# Patient Record
Sex: MALE | Race: OTHER | HISPANIC OR LATINO | URBAN - METROPOLITAN AREA
[De-identification: names, ages, dates, MRNs, and addresses within clinical notes are randomized per-mention and may not be internally consistent; named-entity substitution may affect disease eponyms.]

---

## 2017-04-24 ENCOUNTER — EMERGENCY (EMERGENCY)
Facility: HOSPITAL | Age: 47
LOS: 0 days | Discharge: HOME | End: 2017-04-25

## 2017-06-28 DIAGNOSIS — R10.32 LEFT LOWER QUADRANT PAIN: ICD-10-CM

## 2017-06-28 DIAGNOSIS — Z87.891 PERSONAL HISTORY OF NICOTINE DEPENDENCE: ICD-10-CM

## 2019-06-30 ENCOUNTER — EMERGENCY (EMERGENCY)
Facility: HOSPITAL | Age: 49
LOS: 0 days | Discharge: HOME | End: 2019-06-30
Attending: EMERGENCY MEDICINE | Admitting: EMERGENCY MEDICINE
Payer: COMMERCIAL

## 2019-06-30 VITALS
OXYGEN SATURATION: 97 % | DIASTOLIC BLOOD PRESSURE: 75 MMHG | HEART RATE: 90 BPM | RESPIRATION RATE: 20 BRPM | SYSTOLIC BLOOD PRESSURE: 162 MMHG | TEMPERATURE: 100 F

## 2019-06-30 VITALS — TEMPERATURE: 99 F

## 2019-06-30 DIAGNOSIS — K08.89 OTHER SPECIFIED DISORDERS OF TEETH AND SUPPORTING STRUCTURES: ICD-10-CM

## 2019-06-30 DIAGNOSIS — F17.200 NICOTINE DEPENDENCE, UNSPECIFIED, UNCOMPLICATED: ICD-10-CM

## 2019-06-30 DIAGNOSIS — K04.7 PERIAPICAL ABSCESS WITHOUT SINUS: ICD-10-CM

## 2019-06-30 PROCEDURE — 99283 EMERGENCY DEPT VISIT LOW MDM: CPT

## 2019-06-30 RX ORDER — ACETAMINOPHEN 500 MG
975 TABLET ORAL ONCE
Refills: 0 | Status: COMPLETED | OUTPATIENT
Start: 2019-06-30 | End: 2019-06-30

## 2019-06-30 RX ORDER — AMOXICILLIN 250 MG/5ML
500 SUSPENSION, RECONSTITUTED, ORAL (ML) ORAL ONCE
Refills: 0 | Status: DISCONTINUED | OUTPATIENT
Start: 2019-06-30 | End: 2019-06-30

## 2019-06-30 RX ORDER — IBUPROFEN 200 MG
1 TABLET ORAL
Qty: 21 | Refills: 0
Start: 2019-06-30 | End: 2019-07-06

## 2019-06-30 RX ORDER — AMOXICILLIN 250 MG/5ML
1 SUSPENSION, RECONSTITUTED, ORAL (ML) ORAL
Qty: 30 | Refills: 0
Start: 2019-06-30 | End: 2019-07-09

## 2019-06-30 RX ADMIN — Medication 975 MILLIGRAM(S): at 15:47

## 2019-06-30 NOTE — ED PROVIDER NOTE - PROGRESS NOTE DETAILS
I was present for and supervised the key/critical aspects of the procedures performed during the care of the patient. (I&D of #28 dental abscess performed by Dental resident)

## 2019-06-30 NOTE — ED PROVIDER NOTE - NSFOLLOWUPCLINICS_GEN_ALL_ED_FT
[facility]  Dental  [address] [place] [id number]  Phone: [phone]  Fax:   Follow Up Time:

## 2019-06-30 NOTE — ED PROVIDER NOTE - ATTENDING CONTRIBUTION TO CARE
48y m h/o dm p/w RL dental pain x 2d. Acomp by mild facial swelling. Denies f/c, diff swallowing or breathing, drooling, cough, cp/sob. PE: nad, ncat, mild R mandibular swelling, op- ttp tooth #28, +surrounding gingival erythema, no buccal edema, tongue nl, pharynx nl, no trismus/stridor/drooling, neck supple, rrr nl s1s2, ctab, ext nl.

## 2019-06-30 NOTE — ED PROVIDER NOTE - OBJECTIVE STATEMENT
47 y/o male with hx DM, smoker presents to the ED c/o "I have right lower dental pain with facial swelling since last night. I've had fever." no difficulty swallowing

## 2019-06-30 NOTE — CONSULT NOTE ADULT - SUBJECTIVE AND OBJECTIVE BOX
Patient is a 48y old  Male who presents with a chief complaint of lower right dental pain and swelling.    HPI: Patient reports that he presented to his private dentist 3 weeks ago who said that he may have to       PAST MEDICAL & SURGICAL HISTORY:  No pertinent past medical history    Allergies : No Known Allergies    Vital Signs Last 24 Hrs  T(C): 38 (30 Jun 2019 14:35), Max: 38 (30 Jun 2019 14:35)  T(F): 100.4 (30 Jun 2019 14:35), Max: 100.4 (30 Jun 2019 14:35)  HR: 90 (30 Jun 2019 14:35) (90 - 90)  BP: 162/75 (30 Jun 2019 14:35) (162/75 - 162/75)  BP(mean): --  RR: 20 (30 Jun 2019 14:35) (20 - 20)  SpO2: 97% (30 Jun 2019 14:35) (97% - 97%)    EOE:  TMJ ( -  ) clicks                     ( -  ) pops                     ( -  ) crepitus             Mandible <<FROM>>             Facial bones and MOM <<grossly intact>>             ( -  ) trismus             ( -  ) lymphadenopathy             ( +  ) swelling             ( +  ) asymmetry             ( +  ) palpation             ( -  ) dyspnea             ( -  ) dysphagia             ( -  ) loss of consciousness    IOE:                       Caries << + >>                hard/soft palate:  <<No pathology noted>>            tongue/FOM <<No pathology noted>>            labial/buccal mucosa <<No pathology noted>>           ( +  ) percussion           ( +  ) palpation           ( +  ) swelling            ( -  ) abscess           ( -  ) sinus tract      *ASSESSMENT: Patient presents with lower right swelling limited to the right vestibule. Border of the mandible palpable. Tooth #28 has large distal carious lesion.      *PLAN: Incision and drainage.    PROCEDURE:   Anesthesia: <<  2 carpules of 0.5% marcaine with 1:[id number] epinephrine used for inferior alveolar nerve block and local infiltrations.  >>   Treatment: << Incision and drainage of lower right swelling completed, irrigated with saline.  Patient was advised to either return to his private dentist as soon as possible or present to [facility] dental clinic for emergency care.  >>     RECOMMENDATIONS:  1) << Amoxicillin 500 mg 1 tab PO Q8H and ibuprofen 600 mg  >>  2) Dental F/U with outpatient dentist for comprehensive dental care.   3) If any difficulty swallowing/breathing, fever occur, return to ER.     Resident Name, pager #  Ana Olsen, [phone]

## 2020-12-16 ENCOUNTER — APPOINTMENT (OUTPATIENT)
Dept: ORTHOPEDIC SURGERY | Facility: CLINIC | Age: 50
End: 2020-12-16
Payer: COMMERCIAL

## 2020-12-16 VITALS — TEMPERATURE: 97.5 F

## 2020-12-16 DIAGNOSIS — F17.200 NICOTINE DEPENDENCE, UNSPECIFIED, UNCOMPLICATED: ICD-10-CM

## 2020-12-16 DIAGNOSIS — Z80.8 FAMILY HISTORY OF MALIGNANT NEOPLASM OF OTHER ORGANS OR SYSTEMS: ICD-10-CM

## 2020-12-16 DIAGNOSIS — Z86.39 PERSONAL HISTORY OF OTHER ENDOCRINE, NUTRITIONAL AND METABOLIC DISEASE: ICD-10-CM

## 2020-12-16 PROBLEM — Z00.00 ENCOUNTER FOR PREVENTIVE HEALTH EXAMINATION: Status: ACTIVE | Noted: 2020-12-16

## 2020-12-16 PROCEDURE — 99204 OFFICE O/P NEW MOD 45 MIN: CPT

## 2020-12-16 PROCEDURE — 99072 ADDL SUPL MATRL&STAF TM PHE: CPT

## 2020-12-16 PROCEDURE — 73502 X-RAY EXAM HIP UNI 2-3 VIEWS: CPT | Mod: LT

## 2020-12-16 RX ORDER — METFORMIN HYDROCHLORIDE 625 MG/1
TABLET ORAL
Refills: 0 | Status: ACTIVE | COMMUNITY

## 2020-12-16 NOTE — PHYSICAL EXAM
[de-identified] : General appearance: well nourished and hydrated, pleasant, alert and oriented x 3, cooperative.\par Cardiovascular: no apparent abnormalities, no lower leg edema, no varicosities, pedal pulses are palpable.\par Neurologic: sensation is normal, no muscle weakness in upper or lower extremities\par Dermatologic no apparent skin lesions, moist, warm, no rash.\par Gait: nonantalgic.\par \par Left knee\par Inspection: no effusion or erythema.\par Wounds: none.\par Alignment: normal.\par Palpation: no specific tenderness on palpation.\par ROM: 0-120 degrees\par Ligamentous laxity: all ligaments appear stable\par Muscle Test: good quad strength.\par \par Right knee\par Inspection: no effusion or erythema.\par Wounds: none.\par Alignment: normal.\par Palpation: no specific tenderness on palpation.\par ROM: 0-120 degrees\par Ligamentous laxity: all ligaments appear stable\par Muscle Test: good quad strength.\par \par Left hip\par Inspection: No swelling or ecchymosis.\par Wounds: none.\par Palpation: non-tender.\par Stability: no instability.\par Strength: 5/5 all motor groups.\par ROM: Flexion to 80, ER 20, no IR, ABD 20\par Leg length: equal.\par \par Right hip\par Inspection: No swelling or ecchymosis.\par Wounds: none.\par Palpation: non-tender.\par Stability: no instability.\par Strength: 5/5 all motor groups.\par ROM:Flexion to 90, ER 30, IR 0, ABD 30 \par Leg length: equal.\par \par  Ranging left hip gives pain laterally to the knee.  [de-identified] : Radiographs done today AP pelvis and lateral of the right hip shows the bony structures are intact , the right hip demonstrates about 50 percent joint space narrowing. The left hip has about 80 percent joint space narrowing.

## 2020-12-16 NOTE — HISTORY OF PRESENT ILLNESS
[Pain Location] : pain [] : left hip [Worsening] : worsening [10] : a maximum pain level of 10/10 [Walking] : walking [Standing] : standing [Daily] : ~He/She~ states the symptoms seem to be occuring daily [de-identified] : 50 year old male presents to the office today complaining of left hip pain that has worsened in the last 1.5 months. Patient reports pain that is sharp and achy in nature. Patient reports pain that is at the lateral hip, no groin pain. Pain radiates down the lateral thigh to the knee, but does not extend past the knee. PAtient reports clicking and a loss of motion in the left hip. Patient states some days he has pain immediately with ambulation, while other days he can ambulate only about 3-4 blocks due to the pain. He reports doing okay with negotiating stairs. Patient reports difficulty putting socks and shoes on and reports pain with prolonged sitting. He is taking Aleve for pain with only minimal relief. Patient is here today to discuss his options for pain relief.

## 2020-12-16 NOTE — ASSESSMENT
[FreeTextEntry1] : 50 year old male presents for right hip pain. Several year history of intermittent hip pain. The pain has been getting progressively worse. He feels it during certain activities. At times it is severe. At times he will limp. He usually can walk 3-4 blocks. The pain is not in the groin , it is located laterally with radiation down the thigh towards the knee. At its worst its a 10/10. He has difficulty putting on shoes and socks. He is only taking OTC NSAIDs with some relief. His Hx , exam and radiographs are all consistent with bilateral hip arthritis , more severe on the left than the right . We will start him on Mobic 15 mg po qd. We also discussed weight loss. He can f/u in 1 month. We will evaluate how he's doing at that time.

## 2021-01-13 ENCOUNTER — APPOINTMENT (OUTPATIENT)
Dept: ORTHOPEDIC SURGERY | Facility: CLINIC | Age: 51
End: 2021-01-13

## 2021-01-19 RX ORDER — MELOXICAM 15 MG/1
15 TABLET ORAL
Qty: 30 | Refills: 0 | Status: DISCONTINUED | COMMUNITY
Start: 2020-12-16 | End: 2021-01-19

## 2021-02-03 ENCOUNTER — TRANSCRIPTION ENCOUNTER (OUTPATIENT)
Age: 51
End: 2021-02-03

## 2021-02-03 ENCOUNTER — APPOINTMENT (OUTPATIENT)
Dept: ORTHOPEDIC SURGERY | Facility: CLINIC | Age: 51
End: 2021-02-03
Payer: MEDICAID

## 2021-02-03 DIAGNOSIS — M16.12 UNILATERAL PRIMARY OSTEOARTHRITIS, LEFT HIP: ICD-10-CM

## 2021-02-03 DIAGNOSIS — M16.11 UNILATERAL PRIMARY OSTEOARTHRITIS, RIGHT HIP: ICD-10-CM

## 2021-02-03 PROCEDURE — 99072 ADDL SUPL MATRL&STAF TM PHE: CPT

## 2021-02-03 PROCEDURE — 99214 OFFICE O/P EST MOD 30 MIN: CPT | Mod: 57

## 2021-02-03 RX ORDER — MELOXICAM 15 MG/1
15 TABLET ORAL DAILY
Qty: 30 | Refills: 0 | Status: ACTIVE | COMMUNITY
Start: 2021-02-03 | End: 1900-01-01

## 2021-02-03 RX ORDER — TRAMADOL HYDROCHLORIDE 50 MG/1
50 TABLET, COATED ORAL DAILY
Qty: 25 | Refills: 0 | Status: ACTIVE | COMMUNITY
Start: 2021-02-03 | End: 1900-01-01

## 2021-02-03 NOTE — HISTORY OF PRESENT ILLNESS
[de-identified] : [id number]- 50 year old male presents to the office today complaining of left hip pain that has worsened in the last 1.5 months. Patient reports pain that is sharp and achy in nature. Patient reports pain that is at the lateral hip, no groin pain. Pain radiates down the lateral thigh to the knee, but does not extend past the knee. PAtient reports clicking and a loss of motion in the left hip. Patient states some days he has pain immediately with ambulation, while other days he can ambulate only about 3-4 blocks due to the pain. He reports doing okay with negotiating stairs. Patient reports difficulty putting socks and shoes on and reports pain with prolonged sitting. He is taking Aleve for pain with only minimal relief. Patient is here today to discuss his options for pain relief.\par \par 2/3/2021- 50 year old male presents for follow up of his bilateral painful arthritis hips to discuss treatment options.

## 2021-02-03 NOTE — PHYSICAL EXAM
[de-identified] : 12/16/2020- \par Left knee\par Inspection: no effusion or erythema.\par Wounds: none.\par Alignment: normal.\par Palpation: no specific tenderness on palpation.\par ROM: 0-120 degrees\par Ligamentous laxity: all ligaments appear stable\par Muscle Test: good quad strength.\par \par Right knee\par Inspection: no effusion or erythema.\par Wounds: none.\par Alignment: normal.\par Palpation: no specific tenderness on palpation.\par ROM: 0-120 degrees\par Ligamentous laxity: all ligaments appear stable\par Muscle Test: good quad strength.\par \par Left hip\par Inspection: No swelling or ecchymosis.\par Wounds: none.\par Palpation: non-tender.\par Stability: no instability.\par Strength: 5/5 all motor groups.\par ROM: Flexion to 80, ER 20, no IR, ABD 20\par Leg length: equal.\par \par Right hip\par Inspection: No swelling or ecchymosis.\par Wounds: none.\par Palpation: non-tender.\par Stability: no instability.\par Strength: 5/5 all motor groups.\par ROM:Flexion to 90, ER 30, IR 0, ABD 30 \par Leg length: equal.\par \par  Ranging left hip gives pain laterally to the knee.  [de-identified] : 12/16/2020- Radiographs done today AP pelvis and lateral of the right hip shows the bony structures are intact , the right hip demonstrates about 50 percent joint space narrowing. The left hip has about 80 percent joint space narrowing.

## 2021-02-03 NOTE — ASSESSMENT
[FreeTextEntry1] : 12/16/2020- 50 year old male presents for right hip pain. Several year history of intermittent hip pain. The pain has been getting progressively worse. He feels it during certain activities. At times it is severe. At times he will limp. He usually can walk 3-4 blocks. The pain is not in the groin , it is located laterally with radiation down the thigh towards the knee. At its worst its a 10/10. He has difficulty putting on shoes and socks. He is only taking OTC NSAIDs with some relief. His Hx , exam and radiographs are all consistent with bilateral hip arthritis , more severe on the left than the right . We will start him on Mobic 15 mg po qd. We also discussed weight loss. He can f/u in 1 month. We will evaluate how he's doing at that time. \par \par 02/03/2021- Pt presents for follow up of his bilateral arthritic painful hips to discuss Tx options. He has been taking Mobic without significant relieve. The pain has become incapacitating and he is now at the point he would like to proceed with elective hip replacement surgery. I do think at this point that this is a good option for him. He is a diabetic and will need to see his endocrinologist. He states that his blood sugars are well controlled. His last hemoglobin A1C is in the 6 range. He will also see PCP. In the meantime he will remain on the Mobic, he will stop it the week before surgery. We will also give him Tramadol to take for his pain at night. Pt has advanced arthritis of both hips and was told after surgery left lower extremity will be slightly longer than the right. After his right hip is replaced he should be even. \par \par The risks, benefits, alternatives of treatment, and aftercare precautions following joint replacement surgery were reviewed with the patient and all questions were answered. Models were used, radiographs reviewed and a brochure was given to the patient. The implant type utilized, including bearing surfaces fixation techniques were reviewed in detail, and the patient chose to proceed with elective joint replacement surgery. The patient's body mass index was recorded in my office notes, and for those patients whose  body mass index of greater than 30, I recommended that they review a weight reduction program with their internist. The importance of smoking cessation was reviewed with all patient's, and for those patients who still smoke, I recommended that they review a smoking cessation program with their internist.

## 2021-02-11 ENCOUNTER — OUTPATIENT (OUTPATIENT)
Dept: OUTPATIENT SERVICES | Facility: HOSPITAL | Age: 51
LOS: 1 days | Discharge: HOME | End: 2021-02-11
Payer: MEDICAID

## 2021-02-11 ENCOUNTER — RESULT REVIEW (OUTPATIENT)
Age: 51
End: 2021-02-11

## 2021-02-11 DIAGNOSIS — M25.559 PAIN IN UNSPECIFIED HIP: ICD-10-CM

## 2021-02-11 PROCEDURE — 72192 CT PELVIS W/O DYE: CPT | Mod: 26

## 2021-02-16 ENCOUNTER — RESULT REVIEW (OUTPATIENT)
Age: 51
End: 2021-02-16

## 2021-02-16 ENCOUNTER — OUTPATIENT (OUTPATIENT)
Dept: OUTPATIENT SERVICES | Facility: HOSPITAL | Age: 51
LOS: 1 days | Discharge: HOME | End: 2021-02-16
Payer: MEDICAID

## 2021-02-16 VITALS
SYSTOLIC BLOOD PRESSURE: 144 MMHG | RESPIRATION RATE: 16 BRPM | HEIGHT: 69 IN | TEMPERATURE: 98 F | WEIGHT: 222.01 LBS | HEART RATE: 72 BPM | DIASTOLIC BLOOD PRESSURE: 75 MMHG | OXYGEN SATURATION: 99 %

## 2021-02-16 DIAGNOSIS — M16.12 UNILATERAL PRIMARY OSTEOARTHRITIS, LEFT HIP: ICD-10-CM

## 2021-02-16 DIAGNOSIS — Z90.49 ACQUIRED ABSENCE OF OTHER SPECIFIED PARTS OF DIGESTIVE TRACT: Chronic | ICD-10-CM

## 2021-02-16 DIAGNOSIS — Z98.890 OTHER SPECIFIED POSTPROCEDURAL STATES: Chronic | ICD-10-CM

## 2021-02-16 DIAGNOSIS — Z01.818 ENCOUNTER FOR OTHER PREPROCEDURAL EXAMINATION: ICD-10-CM

## 2021-02-16 LAB
A1C WITH ESTIMATED AVERAGE GLUCOSE RESULT: 7.2 % — HIGH (ref 4–5.6)
ALBUMIN SERPL ELPH-MCNC: 4.4 G/DL — SIGNIFICANT CHANGE UP (ref 3.5–5.2)
ALP SERPL-CCNC: 60 U/L — SIGNIFICANT CHANGE UP (ref 30–115)
ALT FLD-CCNC: 20 U/L — SIGNIFICANT CHANGE UP (ref 0–41)
ANION GAP SERPL CALC-SCNC: 8 MMOL/L — SIGNIFICANT CHANGE UP (ref 7–14)
APTT BLD: 35.9 SEC — SIGNIFICANT CHANGE UP (ref 27–39.2)
AST SERPL-CCNC: 14 U/L — SIGNIFICANT CHANGE UP (ref 0–41)
BASOPHILS # BLD AUTO: 0.03 K/UL — SIGNIFICANT CHANGE UP (ref 0–0.2)
BASOPHILS NFR BLD AUTO: 0.4 % — SIGNIFICANT CHANGE UP (ref 0–1)
BILIRUB SERPL-MCNC: 0.3 MG/DL — SIGNIFICANT CHANGE UP (ref 0.2–1.2)
BLD GP AB SCN SERPL QL: SIGNIFICANT CHANGE UP
BUN SERPL-MCNC: 13 MG/DL — SIGNIFICANT CHANGE UP (ref 10–20)
CALCIUM SERPL-MCNC: 9.5 MG/DL — SIGNIFICANT CHANGE UP (ref 8.5–10.1)
CHLORIDE SERPL-SCNC: 107 MMOL/L — SIGNIFICANT CHANGE UP (ref 98–110)
CO2 SERPL-SCNC: 26 MMOL/L — SIGNIFICANT CHANGE UP (ref 17–32)
CREAT SERPL-MCNC: 0.7 MG/DL — SIGNIFICANT CHANGE UP (ref 0.7–1.5)
EOSINOPHIL # BLD AUTO: 0.14 K/UL — SIGNIFICANT CHANGE UP (ref 0–0.7)
EOSINOPHIL NFR BLD AUTO: 1.8 % — SIGNIFICANT CHANGE UP (ref 0–8)
ESTIMATED AVERAGE GLUCOSE: 160 MG/DL — HIGH (ref 68–114)
GLUCOSE SERPL-MCNC: 156 MG/DL — HIGH (ref 70–99)
HCT VFR BLD CALC: 40 % — LOW (ref 42–52)
HGB BLD-MCNC: 13.5 G/DL — LOW (ref 14–18)
IMM GRANULOCYTES NFR BLD AUTO: 0.3 % — SIGNIFICANT CHANGE UP (ref 0.1–0.3)
INR BLD: 0.97 RATIO — SIGNIFICANT CHANGE UP (ref 0.65–1.3)
LYMPHOCYTES # BLD AUTO: 2.44 K/UL — SIGNIFICANT CHANGE UP (ref 1.2–3.4)
LYMPHOCYTES # BLD AUTO: 32.2 % — SIGNIFICANT CHANGE UP (ref 20.5–51.1)
MCHC RBC-ENTMCNC: 30.6 PG — SIGNIFICANT CHANGE UP (ref 27–31)
MCHC RBC-ENTMCNC: 33.8 G/DL — SIGNIFICANT CHANGE UP (ref 32–37)
MCV RBC AUTO: 90.7 FL — SIGNIFICANT CHANGE UP (ref 80–94)
MONOCYTES # BLD AUTO: 0.63 K/UL — HIGH (ref 0.1–0.6)
MONOCYTES NFR BLD AUTO: 8.3 % — SIGNIFICANT CHANGE UP (ref 1.7–9.3)
MRSA PCR RESULT.: NEGATIVE — SIGNIFICANT CHANGE UP
NEUTROPHILS # BLD AUTO: 4.31 K/UL — SIGNIFICANT CHANGE UP (ref 1.4–6.5)
NEUTROPHILS NFR BLD AUTO: 57 % — SIGNIFICANT CHANGE UP (ref 42.2–75.2)
NRBC # BLD: 0 /100 WBCS — SIGNIFICANT CHANGE UP (ref 0–0)
PLATELET # BLD AUTO: 250 K/UL — SIGNIFICANT CHANGE UP (ref 130–400)
POTASSIUM SERPL-MCNC: 4.7 MMOL/L — SIGNIFICANT CHANGE UP (ref 3.5–5)
POTASSIUM SERPL-SCNC: 4.7 MMOL/L — SIGNIFICANT CHANGE UP (ref 3.5–5)
PROT SERPL-MCNC: 6.8 G/DL — SIGNIFICANT CHANGE UP (ref 6–8)
PROTHROM AB SERPL-ACNC: 11.1 SEC — SIGNIFICANT CHANGE UP (ref 9.95–12.87)
RBC # BLD: 4.41 M/UL — LOW (ref 4.7–6.1)
RBC # FLD: 14.8 % — HIGH (ref 11.5–14.5)
SODIUM SERPL-SCNC: 141 MMOL/L — SIGNIFICANT CHANGE UP (ref 135–146)
WBC # BLD: 7.57 K/UL — SIGNIFICANT CHANGE UP (ref 4.8–10.8)
WBC # FLD AUTO: 7.57 K/UL — SIGNIFICANT CHANGE UP (ref 4.8–10.8)

## 2021-02-16 PROCEDURE — 73502 X-RAY EXAM HIP UNI 2-3 VIEWS: CPT | Mod: 26,LT

## 2021-02-16 PROCEDURE — 71046 X-RAY EXAM CHEST 2 VIEWS: CPT | Mod: 26

## 2021-02-16 PROCEDURE — 93010 ELECTROCARDIOGRAM REPORT: CPT

## 2021-02-16 NOTE — H&P PST ADULT - HISTORY OF PRESENT ILLNESS
Pt has a hx of OA for many years but pt states pain has progressively gotten worse over the last year in left hip. Pt complains of pain rated 11/10 which affects ability to walk and do other tasks. Pt takes NSAIDS for pain relief but states "it does not work that great." Pt now for listed procedure. Denies any chest pain, difficulty breathing, SOB, palpitations, dysuria, URI, or any other infections in the last 2 weeks. Denies any recent travel, contact, or exposure to any persons with known or suspected COVID-19. Pt also denies COVID testing within the last 2 weeks. Denies any suicidal or homicidal ideations. Pt advised to self quarantine until day of procedure. Exercise tolerance of 1-2 flights of stairs without dyspnea but with limitations d/t left hip pain. JAYA reviewed with patient. Pt verbalized understanding of all pre-operative instructions.    Anesthesia Alert  YES--Difficult Airway: Class IV  NO--History of neck surgery or radiation  NO--Limited ROM of neck  NO--History of Malignant hyperthermia  NO--No personal or family history of Pseudocholinesterase deficiency.  NO--Prior Anesthesia Complication  NO--Latex Allergy  NO--Loose teeth  NO--History of Rheumatoid Arthritis  NO--JAYA  YES--Other: Pt states he had a fever (101F) post extubation for back surgery but has had surgeries after and denies having high temp.

## 2021-02-16 NOTE — H&P PST ADULT - REASON FOR ADMISSION
51 yo male presents for PAST in preparation for left total hip replacement on 2/25/2021 under regional anesthesia by Dr. Rausch (OR [facility])

## 2021-02-16 NOTE — H&P PST ADULT - NSANTHOSAYNRD_GEN_A_CORE
No. JAYA screening performed.  STOP BANG Legend: 0-2 = LOW Risk; 3-4 = INTERMEDIATE Risk; 5-8 = HIGH Risk

## 2021-02-22 ENCOUNTER — OUTPATIENT (OUTPATIENT)
Dept: OUTPATIENT SERVICES | Facility: HOSPITAL | Age: 51
LOS: 1 days | Discharge: HOME | End: 2021-02-22

## 2021-02-22 ENCOUNTER — LABORATORY RESULT (OUTPATIENT)
Age: 51
End: 2021-02-22

## 2021-02-22 DIAGNOSIS — Z11.59 ENCOUNTER FOR SCREENING FOR OTHER VIRAL DISEASES: ICD-10-CM

## 2021-02-22 DIAGNOSIS — Z90.49 ACQUIRED ABSENCE OF OTHER SPECIFIED PARTS OF DIGESTIVE TRACT: Chronic | ICD-10-CM

## 2021-02-22 DIAGNOSIS — Z98.890 OTHER SPECIFIED POSTPROCEDURAL STATES: Chronic | ICD-10-CM

## 2021-02-22 PROBLEM — E11.9 TYPE 2 DIABETES MELLITUS WITHOUT COMPLICATIONS: Chronic | Status: ACTIVE | Noted: 2021-02-16

## 2021-02-22 PROBLEM — M19.90 UNSPECIFIED OSTEOARTHRITIS, UNSPECIFIED SITE: Chronic | Status: ACTIVE | Noted: 2021-02-16

## 2021-02-24 ENCOUNTER — RX RENEWAL (OUTPATIENT)
Age: 51
End: 2021-02-24

## 2021-02-24 NOTE — ASU PATIENT PROFILE, ADULT - PMH
DM (diabetes mellitus)    Gastroesophageal reflux disease, unspecified whether esophagitis present    OA (osteoarthritis)

## 2021-02-25 ENCOUNTER — RESULT REVIEW (OUTPATIENT)
Age: 51
End: 2021-02-25

## 2021-02-25 ENCOUNTER — APPOINTMENT (OUTPATIENT)
Dept: ORTHOPEDIC SURGERY | Facility: HOSPITAL | Age: 51
End: 2021-02-25
Payer: MEDICAID

## 2021-02-25 ENCOUNTER — INPATIENT (INPATIENT)
Facility: HOSPITAL | Age: 51
LOS: 0 days | Discharge: HOME HEALTH | End: 2021-02-26
Attending: ORTHOPAEDIC SURGERY | Admitting: ORTHOPAEDIC SURGERY
Payer: MEDICAID

## 2021-02-25 VITALS
HEART RATE: 65 BPM | DIASTOLIC BLOOD PRESSURE: 74 MMHG | SYSTOLIC BLOOD PRESSURE: 133 MMHG | OXYGEN SATURATION: 98 % | TEMPERATURE: 98 F | WEIGHT: 222.01 LBS | HEIGHT: 69 IN | RESPIRATION RATE: 17 BRPM

## 2021-02-25 DIAGNOSIS — Z98.890 OTHER SPECIFIED POSTPROCEDURAL STATES: Chronic | ICD-10-CM
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
## 2021-02-25 DIAGNOSIS — M16.12 UNILATERAL PRIMARY OSTEOARTHRITIS, LEFT HIP: ICD-10-CM

## 2021-02-25 DIAGNOSIS — Z90.49 ACQUIRED ABSENCE OF OTHER SPECIFIED PARTS OF DIGESTIVE TRACT: Chronic | ICD-10-CM

## 2021-02-25 DIAGNOSIS — K21.9 GASTRO-ESOPHAGEAL REFLUX DISEASE WITHOUT ESOPHAGITIS: ICD-10-CM

## 2021-02-25 DIAGNOSIS — S05.02XA INJURY OF CONJUNCTIVA AND CORNEAL ABRASION WITHOUT FOREIGN BODY, LEFT EYE, INITIAL ENCOUNTER: ICD-10-CM

## 2021-02-25 DIAGNOSIS — E11.9 TYPE 2 DIABETES MELLITUS WITHOUT COMPLICATIONS: ICD-10-CM

## 2021-02-25 DIAGNOSIS — Z87.891 PERSONAL HISTORY OF NICOTINE DEPENDENCE: ICD-10-CM
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
## 2021-02-25 LAB
ABO RH CONFIRMATION: SIGNIFICANT CHANGE UP
GLUCOSE BLDC GLUCOMTR-MCNC: 134 MG/DL — HIGH (ref 70–99)
GLUCOSE BLDC GLUCOMTR-MCNC: 135 MG/DL — HIGH (ref 70–99)
GLUCOSE BLDC GLUCOMTR-MCNC: 211 MG/DL — HIGH (ref 70–99)
GLUCOSE BLDC GLUCOMTR-MCNC: 218 MG/DL — HIGH (ref 70–99)
GLUCOSE BLDC GLUCOMTR-MCNC: 225 MG/DL — HIGH (ref 70–99)

## 2021-02-25 PROCEDURE — 88311 DECALCIFY TISSUE: CPT | Mod: 26

## 2021-02-25 PROCEDURE — 27130 TOTAL HIP ARTHROPLASTY: CPT | Mod: LT

## 2021-02-25 PROCEDURE — 88304 TISSUE EXAM BY PATHOLOGIST: CPT | Mod: 26

## 2021-02-25 RX ORDER — MELOXICAM 15 MG/1
1 TABLET ORAL
Qty: 0 | Refills: 0 | DISCHARGE

## 2021-02-25 RX ORDER — DEXTROSE 50 % IN WATER 50 %
15 SYRINGE (ML) INTRAVENOUS ONCE
Refills: 0 | Status: DISCONTINUED | OUTPATIENT
Start: 2021-02-25 | End: 2021-02-26

## 2021-02-25 RX ORDER — DEXTROSE 50 % IN WATER 50 %
25 SYRINGE (ML) INTRAVENOUS ONCE
Refills: 0 | Status: DISCONTINUED | OUTPATIENT
Start: 2021-02-25 | End: 2021-02-26

## 2021-02-25 RX ORDER — ONDANSETRON 8 MG/1
4 TABLET, FILM COATED ORAL ONCE
Refills: 0 | Status: DISCONTINUED | OUTPATIENT
Start: 2021-02-25 | End: 2021-02-25

## 2021-02-25 RX ORDER — PANTOPRAZOLE SODIUM 20 MG/1
40 TABLET, DELAYED RELEASE ORAL
Refills: 0 | Status: DISCONTINUED | OUTPATIENT
Start: 2021-02-25 | End: 2021-02-26

## 2021-02-25 RX ORDER — FERROUS SULFATE 325(65) MG
325 TABLET ORAL DAILY
Refills: 0 | Status: DISCONTINUED | OUTPATIENT
Start: 2021-02-25 | End: 2021-02-26

## 2021-02-25 RX ORDER — HYDROMORPHONE HYDROCHLORIDE 2 MG/ML
0.5 INJECTION INTRAMUSCULAR; INTRAVENOUS; SUBCUTANEOUS
Refills: 0 | Status: DISCONTINUED | OUTPATIENT
Start: 2021-02-25 | End: 2021-02-25

## 2021-02-25 RX ORDER — CHLORHEXIDINE GLUCONATE 213 G/1000ML
1 SOLUTION TOPICAL
Refills: 0 | Status: DISCONTINUED | OUTPATIENT
Start: 2021-02-25 | End: 2021-02-26

## 2021-02-25 RX ORDER — SODIUM CHLORIDE 9 MG/ML
1000 INJECTION, SOLUTION INTRAVENOUS
Refills: 0 | Status: DISCONTINUED | OUTPATIENT
Start: 2021-02-25 | End: 2021-02-26

## 2021-02-25 RX ORDER — CELECOXIB 200 MG/1
400 CAPSULE ORAL ONCE
Refills: 0 | Status: COMPLETED | OUTPATIENT
Start: 2021-02-25 | End: 2021-02-25

## 2021-02-25 RX ORDER — GLUCAGON INJECTION, SOLUTION 0.5 MG/.1ML
1 INJECTION, SOLUTION SUBCUTANEOUS ONCE
Refills: 0 | Status: DISCONTINUED | OUTPATIENT
Start: 2021-02-25 | End: 2021-02-26

## 2021-02-25 RX ORDER — OFLOXACIN 0.3 %
1 DROPS OPHTHALMIC (EYE) EVERY 6 HOURS
Refills: 0 | Status: COMPLETED | OUTPATIENT
Start: 2021-02-25 | End: 2021-02-26

## 2021-02-25 RX ORDER — ASPIRIN/CALCIUM CARB/MAGNESIUM 324 MG
81 TABLET ORAL EVERY 12 HOURS
Refills: 0 | Status: DISCONTINUED | OUTPATIENT
Start: 2021-02-25 | End: 2021-02-26

## 2021-02-25 RX ORDER — KETOROLAC TROMETHAMINE 30 MG/ML
15 SYRINGE (ML) INJECTION EVERY 6 HOURS
Refills: 0 | Status: DISCONTINUED | OUTPATIENT
Start: 2021-02-25 | End: 2021-02-26

## 2021-02-25 RX ORDER — DEXTROSE 50 % IN WATER 50 %
12.5 SYRINGE (ML) INTRAVENOUS ONCE
Refills: 0 | Status: DISCONTINUED | OUTPATIENT
Start: 2021-02-25 | End: 2021-02-26

## 2021-02-25 RX ORDER — SODIUM CHLORIDE 9 MG/ML
1000 INJECTION INTRAMUSCULAR; INTRAVENOUS; SUBCUTANEOUS
Refills: 0 | Status: DISCONTINUED | OUTPATIENT
Start: 2021-02-25 | End: 2021-02-26

## 2021-02-25 RX ORDER — CEFAZOLIN SODIUM 1 G
2000 VIAL (EA) INJECTION EVERY 8 HOURS
Refills: 0 | Status: COMPLETED | OUTPATIENT
Start: 2021-02-25 | End: 2021-02-26

## 2021-02-25 RX ORDER — CELECOXIB 200 MG/1
200 CAPSULE ORAL EVERY 12 HOURS
Refills: 0 | Status: DISCONTINUED | OUTPATIENT
Start: 2021-02-26 | End: 2021-02-26

## 2021-02-25 RX ORDER — INSULIN LISPRO 100/ML
VIAL (ML) SUBCUTANEOUS
Refills: 0 | Status: DISCONTINUED | OUTPATIENT
Start: 2021-02-25 | End: 2021-02-26

## 2021-02-25 RX ORDER — ONDANSETRON 8 MG/1
4 TABLET, FILM COATED ORAL EVERY 6 HOURS
Refills: 0 | Status: DISCONTINUED | OUTPATIENT
Start: 2021-02-25 | End: 2021-02-26

## 2021-02-25 RX ORDER — METFORMIN HYDROCHLORIDE 850 MG/1
1 TABLET ORAL
Qty: 0 | Refills: 0 | DISCHARGE

## 2021-02-25 RX ORDER — ACETAMINOPHEN 500 MG
1000 TABLET ORAL ONCE
Refills: 0 | Status: DISCONTINUED | OUTPATIENT
Start: 2021-02-25 | End: 2021-02-25

## 2021-02-25 RX ORDER — SODIUM CHLORIDE 9 MG/ML
1000 INJECTION, SOLUTION INTRAVENOUS
Refills: 0 | Status: DISCONTINUED | OUTPATIENT
Start: 2021-02-25 | End: 2021-02-25

## 2021-02-25 RX ORDER — METFORMIN HYDROCHLORIDE 850 MG/1
1000 TABLET ORAL
Refills: 0 | Status: DISCONTINUED | OUTPATIENT
Start: 2021-02-26 | End: 2021-02-26

## 2021-02-25 RX ORDER — ACETAMINOPHEN 500 MG
650 TABLET ORAL EVERY 6 HOURS
Refills: 0 | Status: DISCONTINUED | OUTPATIENT
Start: 2021-02-25 | End: 2021-02-26

## 2021-02-25 RX ORDER — TRAMADOL HYDROCHLORIDE 50 MG/1
50 TABLET ORAL EVERY 4 HOURS
Refills: 0 | Status: DISCONTINUED | OUTPATIENT
Start: 2021-02-25 | End: 2021-02-26

## 2021-02-25 RX ADMIN — Medication 100 MILLIGRAM(S): at 17:50

## 2021-02-25 RX ADMIN — TRAMADOL HYDROCHLORIDE 50 MILLIGRAM(S): 50 TABLET ORAL at 15:33

## 2021-02-25 RX ADMIN — Medication 15 MILLIGRAM(S): at 17:50

## 2021-02-25 RX ADMIN — PANTOPRAZOLE SODIUM 40 MILLIGRAM(S): 20 TABLET, DELAYED RELEASE ORAL at 17:50

## 2021-02-25 RX ADMIN — Medication 650 MILLIGRAM(S): at 12:29

## 2021-02-25 RX ADMIN — Medication 81 MILLIGRAM(S): at 17:53

## 2021-02-25 RX ADMIN — HYDROMORPHONE HYDROCHLORIDE 0.5 MILLIGRAM(S): 2 INJECTION INTRAMUSCULAR; INTRAVENOUS; SUBCUTANEOUS at 12:39

## 2021-02-25 RX ADMIN — SODIUM CHLORIDE 100 MILLILITER(S): 9 INJECTION INTRAMUSCULAR; INTRAVENOUS; SUBCUTANEOUS at 14:14

## 2021-02-25 RX ADMIN — Medication 1 DROP(S): at 16:31

## 2021-02-25 RX ADMIN — Medication 325 MILLIGRAM(S): at 12:29

## 2021-02-25 RX ADMIN — Medication 4: at 16:03

## 2021-02-25 RX ADMIN — Medication 650 MILLIGRAM(S): at 17:50

## 2021-02-25 RX ADMIN — Medication 1 TABLET(S): at 12:30

## 2021-02-25 RX ADMIN — SODIUM CHLORIDE 75 MILLILITER(S): 9 INJECTION, SOLUTION INTRAVENOUS at 12:10

## 2021-02-25 RX ADMIN — Medication 1 DROP(S): at 21:20

## 2021-02-25 RX ADMIN — CELECOXIB 400 MILLIGRAM(S): 200 CAPSULE ORAL at 06:30

## 2021-02-25 NOTE — OCCUPATIONAL THERAPY INITIAL EVALUATION ADULT - GENERAL OBSERVATIONS, REHAB EVAL
13:55-14:15 chart reviewed, ok to treat by Occupational Therapist as confirmed by RN, Pt received semifowler in bed +aquacel of left hip in NAD. Pt in agreement with OT IE.

## 2021-02-25 NOTE — PROGRESS NOTE ADULT - SUBJECTIVE AND OBJECTIVE BOX
ORTHO POST OP CHECK      50y Male POD #  0    S/P left Total Hip Arthroplasty     Patient seen and examined at bedside . The patient is awake and alert in NAD. No complaints of chest pain, SOB, N/V. The patient is complaining of eye irritation - feels like something is in there,     PAST MEDICAL & SURGICAL HISTORY:  Gastroesophageal reflux disease, unspecified whether esophagitis present    OA (osteoarthritis)    DM (diabetes mellitus)    S/P cholecystectomy    S/P appendectomy    History of back surgery  L4, L5          MEDICATIONS  (STANDING):  acetaminophen   Tablet .. 650 milliGRAM(s) Oral every 6 hours  aspirin enteric coated 81 milliGRAM(s) Oral every 12 hours  ceFAZolin   IVPB 2000 milliGRAM(s) IV Intermittent every 8 hours  chlorhexidine 4% Liquid 1 Application(s) Topical <User Schedule>  dextrose 40% Gel 15 Gram(s) Oral once  dextrose 5%. 1000 milliLiter(s) (50 mL/Hr) IV Continuous <Continuous>  dextrose 5%. 1000 milliLiter(s) (100 mL/Hr) IV Continuous <Continuous>  dextrose 50% Injectable 25 Gram(s) IV Push once  dextrose 50% Injectable 12.5 Gram(s) IV Push once  dextrose 50% Injectable 25 Gram(s) IV Push once  ferrous    sulfate 325 milliGRAM(s) Oral daily  glucagon  Injectable 1 milliGRAM(s) IntraMuscular once  insulin lispro (ADMELOG) corrective regimen sliding scale   SubCutaneous three times a day before meals  ketorolac   Injectable 15 milliGRAM(s) IV Push every 6 hours  lactated ringers. 1000 milliLiter(s) (75 mL/Hr) IV Continuous <Continuous>  multivitamin 1 Tablet(s) Oral daily  pantoprazole    Tablet 40 milliGRAM(s) Oral before breakfast  sodium chloride 0.9%. 1000 milliLiter(s) (100 mL/Hr) IV Continuous <Continuous>    MEDICATIONS  (PRN):  HYDROmorphone  Injectable 0.5 milliGRAM(s) IV Push every 10 minutes PRN Moderate Pain (4 - 6)  ondansetron Injectable 4 milliGRAM(s) IV Push every 6 hours PRN Nausea and/or Vomiting  ondansetron Injectable 4 milliGRAM(s) IV Push once PRN Nausea and/or Vomiting  traMADol 50 milliGRAM(s) Oral every 4 hours PRN Severe Pain (7 - 10)        Vital Signs Last 24 Hrs  T(C): 36.6 (25 Feb 2021 14:20), Max: 37.3 (25 Feb 2021 11:51)  T(F): 97.9 (25 Feb 2021 14:20), Max: 99.1 (25 Feb 2021 11:51)  HR: 92 (25 Feb 2021 14:20) (65 - 92)  BP: 125/66 (25 Feb 2021 14:20) (110/60 - 134/67)  BP(mean): --  RR: 19 (25 Feb 2021 13:00) (13 - 20)  SpO2: 98% (25 Feb 2021 13:00) (98% - 100%)                  DVT ppx : aspirin         PE:  The patient was seen and examined at bedside          A&OX3, NAD          dressing C/D/I          Compartments soft         NVI, SILT           A/P:              POD # 0      s/p  left Total Hip Arthroplasty                   OOB to Chair            Physical Therapy-           Pain control - per pain protocol            Incentive Spirometry            DVT Prophylaxis -asa              f/u am labs              eye irritation post anesthesia- anesthesia called to evaluate                       GERD- continue Protonix                   DM- monitor FS glucose, resume home meds on discharge                  Dispo: home with [facility]

## 2021-02-25 NOTE — PHYSICAL THERAPY INITIAL EVALUATION ADULT - GAIT DEVIATIONS NOTED, PT EVAL
forward flexed trunk. dec heel strike and push off/decreased carol ann/decreased step length/decreased stride length/increased stride width/decreased weight-shifting ability

## 2021-02-25 NOTE — OCCUPATIONAL THERAPY INITIAL EVALUATION ADULT - LIVES WITH, PROFILE
in a private house 3 steps to enter handrail on the right side. 12 steps to the bedroom and bathroom. +tub w/ shower and shower doors./children/spouse

## 2021-02-25 NOTE — PHYSICAL THERAPY INITIAL EVALUATION ADULT - ADDITIONAL COMMENTS
Pt reports prior to surgery he was ambulating independently without AD. Pt lives in a private house with his wife and children. There are 4 steps on outside of house with rail on R; then 10 steps with rail on R to go up to 2nd floor where bedroom and bathroom are.

## 2021-02-25 NOTE — CHART NOTE - NSCHARTNOTEFT_GEN_A_CORE
Left Eye Pain relieved with Tetracaine Ophthalmic eye drop. Will administer Ofloxacin 1 gtt q 6H x 3 doses as per corneal abrasion protocol. Will follow.

## 2021-02-25 NOTE — PHYSICAL THERAPY INITIAL EVALUATION ADULT - GENERAL OBSERVATIONS, REHAB EVAL
Pt encountered sitting in chair bedside, NAD, +IV (disconnected by RN for session), +aquacel dressing L hip, ok to be seen by PT as confirmed by RN and pt agreeable. +chart reviewed

## 2021-02-25 NOTE — CHART NOTE - NSCHARTNOTEFT_GEN_A_CORE
Called  to evaluate patient complaining of Left Eye Pain SP Hip Replacement.  Left Eye injected.  Patient complaining of pain/foreign body sensation . Will administer Tetracaine eye drop .5% to rule out possible corneal abrasion.

## 2021-02-26 ENCOUNTER — TRANSCRIPTION ENCOUNTER (OUTPATIENT)
Age: 51
End: 2021-02-26

## 2021-02-26 VITALS
HEART RATE: 80 BPM | DIASTOLIC BLOOD PRESSURE: 65 MMHG | TEMPERATURE: 97 F | SYSTOLIC BLOOD PRESSURE: 115 MMHG | RESPIRATION RATE: 18 BRPM

## 2021-02-26 LAB
ANION GAP SERPL CALC-SCNC: 8 MMOL/L — SIGNIFICANT CHANGE UP (ref 7–14)
BUN SERPL-MCNC: 16 MG/DL — SIGNIFICANT CHANGE UP (ref 10–20)
CALCIUM SERPL-MCNC: 8.8 MG/DL — SIGNIFICANT CHANGE UP (ref 8.5–10.1)
CHLORIDE SERPL-SCNC: 106 MMOL/L — SIGNIFICANT CHANGE UP (ref 98–110)
CO2 SERPL-SCNC: 25 MMOL/L — SIGNIFICANT CHANGE UP (ref 17–32)
CREAT SERPL-MCNC: 0.8 MG/DL — SIGNIFICANT CHANGE UP (ref 0.7–1.5)
GLUCOSE BLDC GLUCOMTR-MCNC: 143 MG/DL — HIGH (ref 70–99)
GLUCOSE BLDC GLUCOMTR-MCNC: 199 MG/DL — HIGH (ref 70–99)
GLUCOSE BLDC GLUCOMTR-MCNC: 232 MG/DL — HIGH (ref 70–99)
GLUCOSE SERPL-MCNC: 134 MG/DL — HIGH (ref 70–99)
HCT VFR BLD CALC: 27.5 % — LOW (ref 42–52)
HGB BLD-MCNC: 9.3 G/DL — LOW (ref 14–18)
MCHC RBC-ENTMCNC: 30.8 PG — SIGNIFICANT CHANGE UP (ref 27–31)
MCHC RBC-ENTMCNC: 33.8 G/DL — SIGNIFICANT CHANGE UP (ref 32–37)
MCV RBC AUTO: 91.1 FL — SIGNIFICANT CHANGE UP (ref 80–94)
NRBC # BLD: 0 /100 WBCS — SIGNIFICANT CHANGE UP (ref 0–0)
PLATELET # BLD AUTO: 190 K/UL — SIGNIFICANT CHANGE UP (ref 130–400)
POTASSIUM SERPL-MCNC: 4.3 MMOL/L — SIGNIFICANT CHANGE UP (ref 3.5–5)
POTASSIUM SERPL-SCNC: 4.3 MMOL/L — SIGNIFICANT CHANGE UP (ref 3.5–5)
RBC # BLD: 3.02 M/UL — LOW (ref 4.7–6.1)
RBC # FLD: 14.6 % — HIGH (ref 11.5–14.5)
SODIUM SERPL-SCNC: 139 MMOL/L — SIGNIFICANT CHANGE UP (ref 135–146)
WBC # BLD: 15.05 K/UL — HIGH (ref 4.8–10.8)
WBC # FLD AUTO: 15.05 K/UL — HIGH (ref 4.8–10.8)

## 2021-02-26 PROCEDURE — 99223 1ST HOSP IP/OBS HIGH 75: CPT

## 2021-02-26 RX ORDER — OMEPRAZOLE 10 MG/1
1 CAPSULE, DELAYED RELEASE ORAL
Qty: 45 | Refills: 0
Start: 2021-02-26 | End: 2021-04-11

## 2021-02-26 RX ORDER — TRAMADOL HYDROCHLORIDE 50 MG/1
1 TABLET ORAL
Qty: 30 | Refills: 0
Start: 2021-02-26 | End: 2021-03-02

## 2021-02-26 RX ORDER — OMEPRAZOLE 10 MG/1
1 CAPSULE, DELAYED RELEASE ORAL
Qty: 0 | Refills: 0 | DISCHARGE

## 2021-02-26 RX ORDER — ACETAMINOPHEN 500 MG
2 TABLET ORAL
Qty: 112 | Refills: 0
Start: 2021-02-26 | End: 2021-03-11

## 2021-02-26 RX ORDER — ASPIRIN/CALCIUM CARB/MAGNESIUM 324 MG
1 TABLET ORAL
Qty: 90 | Refills: 0
Start: 2021-02-26 | End: 2021-04-11

## 2021-02-26 RX ORDER — CELECOXIB 200 MG/1
1 CAPSULE ORAL
Qty: 14 | Refills: 0
Start: 2021-02-26 | End: 2021-03-11

## 2021-02-26 RX ADMIN — Medication 1 DROP(S): at 00:04

## 2021-02-26 RX ADMIN — Medication 1 DROP(S): at 05:12

## 2021-02-26 RX ADMIN — Medication 15 MILLIGRAM(S): at 11:19

## 2021-02-26 RX ADMIN — Medication 325 MILLIGRAM(S): at 11:19

## 2021-02-26 RX ADMIN — METFORMIN HYDROCHLORIDE 1000 MILLIGRAM(S): 850 TABLET ORAL at 05:12

## 2021-02-26 RX ADMIN — Medication 100 MILLIGRAM(S): at 00:06

## 2021-02-26 RX ADMIN — Medication 650 MILLIGRAM(S): at 00:04

## 2021-02-26 RX ADMIN — TRAMADOL HYDROCHLORIDE 50 MILLIGRAM(S): 50 TABLET ORAL at 05:12

## 2021-02-26 RX ADMIN — Medication 15 MILLIGRAM(S): at 05:12

## 2021-02-26 RX ADMIN — Medication 81 MILLIGRAM(S): at 05:12

## 2021-02-26 RX ADMIN — SODIUM CHLORIDE 100 MILLILITER(S): 9 INJECTION INTRAMUSCULAR; INTRAVENOUS; SUBCUTANEOUS at 05:12

## 2021-02-26 RX ADMIN — Medication 650 MILLIGRAM(S): at 05:12

## 2021-02-26 RX ADMIN — Medication 1 TABLET(S): at 11:19

## 2021-02-26 RX ADMIN — Medication 650 MILLIGRAM(S): at 11:19

## 2021-02-26 RX ADMIN — Medication 15 MILLIGRAM(S): at 00:04

## 2021-02-26 RX ADMIN — PANTOPRAZOLE SODIUM 40 MILLIGRAM(S): 20 TABLET, DELAYED RELEASE ORAL at 05:12

## 2021-02-26 NOTE — DISCHARGE NOTE PROVIDER - NSDCCPCAREPLAN_GEN_ALL_CORE_FT
PRINCIPAL DISCHARGE DIAGNOSIS  Diagnosis: Status post total hip replacement, left  Assessment and Plan of Treatment:       SECONDARY DISCHARGE DIAGNOSES  Diagnosis: Corneal abrasion  Assessment and Plan of Treatment:

## 2021-02-26 NOTE — DISCHARGE NOTE PROVIDER - NSDCMRMEDTOKEN_GEN_ALL_CORE_FT
meloxicam 15 mg oral tablet: 1 tab(s) orally once a day  metFORMIN 1000 mg oral tablet: 1 tab(s) orally 2 times a day  omeprazole 40 mg oral delayed release capsule: 1 cap(s) orally once a day   acetaminophen 325 mg oral tablet: 2 tab(s) orally every 6 hours MDD:8  aspirin 81 mg oral delayed release tablet: 1 tab(s) orally every 12 hours MDD:2  celecoxib 200 mg oral capsule: 1 cap(s) orally once a day   meloxicam 15 mg oral tablet: 1 tab(s) orally once a day  metFORMIN 1000 mg oral tablet: 1 tab(s) orally 2 times a day  omeprazole 40 mg oral delayed release capsule: 1 cap(s) orally once a day MDD:1  traMADol 50 mg oral tablet: 1 tab(s) orally every 4 hours, As needed, Severe Pain (7 - 10) MDD:6

## 2021-02-26 NOTE — DISCHARGE NOTE PROVIDER - HOSPITAL COURSE
50 year old male underwent an elective left total hip arthroplasty with  on 2/25/2021. Patient tolerated surgery well with no major intra/post operative complications. Patient sustained a left eye corneal abrasion during surgery and treated with tetracaine and ofloxacin while admitted. Resolved upon discharge. Patient was given intra/post operative antibiotics for infection prophylaxis. Patient will be discharged on Aspirin 81mg BID x6 weeks to prevent blood clots. Patient worked with Physical Therapy while admitted to the hospital. Patient is stable for discharge.

## 2021-02-26 NOTE — DISCHARGE NOTE NURSING/CASE MANAGEMENT/SOCIAL WORK - PATIENT PORTAL LINK FT
You can access the FollowMyHealth Patient Portal offered by [facility] by registering at the following website: [email or web address]. By joining [employer]’s FollowMyHealth portal, you will also be able to view your health information using other applications (apps) compatible with our system.

## 2021-02-26 NOTE — DISCHARGE NOTE PROVIDER - CARE PROVIDER_API CALL
Cayetano Rausch)  Orthopaedic Surgery  [address] [place] [address], [address]  [place] [address]  Phone: [phone]  Fax: [phone]  Established Patient  Follow Up Time: 2 weeks

## 2021-02-26 NOTE — PROGRESS NOTE ADULT - SUBJECTIVE AND OBJECTIVE BOX
Orthopedic Surgery Progress Note    50 year old male s/p elective left total hip arthroplasty POD #1. Patient seen and examined bedside this morning, doing well, no complaints of pain or overnight events. Patient sustained a corneal abrasion during surgery, being treated by anesthesia with tetracaine drops; patient states his eye pain has been relieved since, will continue with Ofloxacin 1 gtt q 6H x 3 doses as per corneal abrasion protocol as per anesthesia reccs. Patient worked with PT/OT yesterday afternoon, doing well, would like to be discharged today if cleared by PT. No numbness/tingling, SOB/N/V/D.     MEDICATIONS  (STANDING):  acetaminophen   Tablet .. 650 milliGRAM(s) Oral every 6 hours  aspirin enteric coated 81 milliGRAM(s) Oral every 12 hours  celecoxib 200 milliGRAM(s) Oral every 12 hours  chlorhexidine 4% Liquid 1 Application(s) Topical <User Schedule>  dextrose 40% Gel 15 Gram(s) Oral once  dextrose 5%. 1000 milliLiter(s) (50 mL/Hr) IV Continuous <Continuous>  dextrose 5%. 1000 milliLiter(s) (100 mL/Hr) IV Continuous <Continuous>  dextrose 50% Injectable 25 Gram(s) IV Push once  dextrose 50% Injectable 12.5 Gram(s) IV Push once  dextrose 50% Injectable 25 Gram(s) IV Push once  ferrous    sulfate 325 milliGRAM(s) Oral daily  glucagon  Injectable 1 milliGRAM(s) IntraMuscular once  insulin lispro (ADMELOG) corrective regimen sliding scale   SubCutaneous three times a day before meals  ketorolac   Injectable 15 milliGRAM(s) IV Push every 6 hours  metFORMIN 1000 milliGRAM(s) Oral two times a day  multivitamin 1 Tablet(s) Oral daily  pantoprazole    Tablet 40 milliGRAM(s) Oral before breakfast  sodium chloride 0.9%. 1000 milliLiter(s) (100 mL/Hr) IV Continuous <Continuous>    MEDICATIONS  (PRN):  ondansetron Injectable 4 milliGRAM(s) IV Push every 6 hours PRN Nausea and/or Vomiting  traMADol 50 milliGRAM(s) Oral every 4 hours PRN Severe Pain (7 - 10)      Vital Signs Last 24 Hrs  T(C): 36.6 (26 Feb 2021 05:00), Max: 37.6 (26 Feb 2021 00:00)  T(F): 97.9 (26 Feb 2021 05:00), Max: 99.6 (26 Feb 2021 00:00)  HR: 75 (26 Feb 2021 05:00) (72 - 92)  BP: 111/72 (26 Feb 2021 05:00) (110/60 - 135/69)  BP(mean): --  RR: 18 (26 Feb 2021 05:00) (13 - 20)  SpO2: 98% (25 Feb 2021 13:00) (98% - 100%)    PE:  patient laying in bed, NAD, resting comfortably     LLE:  Aquacel in place on left hip; c/d/i  compartments soft and compressible  FROM at hip/knee/ankle   motor intact  SILT  good dp/pt pulses   foot wwp, bcr     MORNING LABS PENDING     A/P:50 year old male s/p elective L DANA   -WBAT LLE; OOBTC   -cont corneal abrasion drops as per anesthesia reccs--> Ofloxacin 1 gtt q 6H x 3 doses   -Incentive Spirometry  -pain control  -PT/OT   -DVT PPX: asa 81 BID and SCDs   -cont home meds   -dispo planning; home with hcs today

## 2021-02-26 NOTE — DISCHARGE NOTE PROVIDER - NSDCACTIVITY_GEN_ALL_CORE
Bathing allowed/Showering allowed/Stairs allowed/Walking - Indoors allowed/No heavy lifting/straining/Walking - Outdoors allowed

## 2021-02-26 NOTE — DISCHARGE NOTE PROVIDER - NSDCFUADDINST_GEN_ALL_CORE_FT
Please continue Physical therapy at home, WBAT.   Continue Aspirin 81mg twice a day x6 weeks to prevent blood clots.   Continue protonix 40mg once daily x30 days for GI prophylaxis.   Continue pain medication as prescribed as needed.   Keep post-op dressing on for 1 week, showering with dressing on is allowed; dressing is water resistant. Once removed do not apply creams/lotions to incision site. Notify Your surgeon for any foul smelling pus/drainage from incision site.  Follow up with  in 2 weeks.   Please contact  with any concerning symptoms.

## 2021-02-26 NOTE — CONSULT NOTE ADULT - ASSESSMENT
Patient is 49 yo male with hx of OA, and DM2 presenting with       1. S/P Left Total hip replacement  Continue with pain management, DVT proph, and wound care as per Ortho.  PT/OT    2. DM2  -ISS  -Monitor POC      #Progress Note Handoff  Pending (specify):  further care as per ortho  Family discussion:  plan of care was discussed with patient   in details.  all questions were answered.  seems to understand, and in agreement  Disposition:  unknown

## 2021-03-02 LAB — SURGICAL PATHOLOGY STUDY: SIGNIFICANT CHANGE UP

## 2021-03-02 NOTE — CONSULT NOTE ADULT - SUBJECTIVE AND OBJECTIVE BOX
CC.  S/P left Total hip replacement   HPI.  Patient reports left hip pain is controlled.  Offers no other complaints    Constitutional: No fever, fatigue or weight loss.  Skin: No rash.  Eyes: No recent vision problems or eye pain.  ENT: No congestion, ear pain, or sore throat.  Endocrine: No thyroid problems.  Cardiovascular: No chest pain or palpation.  Respiratory: No cough, shortness of breath, congestion, or wheezing.  Gastrointestinal: No abdominal pain, nausea, vomiting, or diarrhea.  Genitourinary: No dysuria.  Musculoskeletal: No joint swelling.  Neurologic: No headache.         Allergies/Medications:   Allergies:        Allergies:  	No Known Allergies:     Home Medications:   * Patient Currently Takes Medications as of 16-Feb-2021 08:20 documented in Structured Notes  · 	metFORMIN 1000 mg oral tablet: Last Dose Taken:  , 1 tab(s) orally 2 times a day  · 	meloxicam 15 mg oral tablet: Last Dose Taken:  , 1 tab(s) orally once a day    PMH/PSH/FH/SH:    Past Medical, Past Surgical, and Family History:  PAST MEDICAL HISTORY:  DM (diabetes mellitus)     OA (osteoarthritis).     PAST SURGICAL HISTORY:  History of back surgery     S/P appendectomy     S/P cholecystectomy.     FAMILY HISTORY:  Family history of diabetes mellitus (DM).     Social History:  · Marital Status	  · Lives With	spouse     Substance Use History:  · Substance Use	never used     Alcohol Use History:  · Have you ever consumed alcohol	never     Tobacco Usage:  · Tobacco Usage: Former smoker  · Tobacco Type: cigarettes  · Number of Packs per Day: 0.75  · Number of yrs: 35  · Pack yrs: 26      Vital Signs Last 24 Hrs  T(C): 36.3 (26 Feb 2021 09:00), Max: 37.6 (26 Feb 2021 00:00)  T(F): 97.3 (26 Feb 2021 09:00), Max: 99.6 (26 Feb 2021 00:00)  HR: 80 (26 Feb 2021 09:00) (72 - 92)  BP: 115/65 (26 Feb 2021 09:00) (110/60 - 135/69)  BP(mean): --  RR: 18 (26 Feb 2021 09:00) (13 - 20)  SpO2: 98% (25 Feb 2021 13:00) (98% - 100%)            PHYSICAL EXAM-  GENERAL: NAD, well-groomed, well-developed  HEAD:  Atraumatic, Normocephalic  EYES: EOMI, PERRLA, conjunctiva and sclera clear  NECK: Supple, No JVD, Normal thyroid  NERVOUS SYSTEM:  Alert & Oriented X3, Motor Strength 5/5 B/L upper and lower extremities; DTRs 2+ intact and symmetric  CHEST/LUNG: Clear to percussion bilaterally; No rales, rhonchi, wheezing, or rubs  HEART: Regular rate and rhythm; No murmurs, rubs, or gallops  ABDOMEN: Soft, Nontender, Nondistended; Bowel sounds present  EXTREMITIES:  2+ Peripheral Pulses, No clubbing, cyanosis, or edema  SKIN: No rashes or lesions                                  9.3    15.05 )-----------( 190      ( 26 Feb 2021 05:56 )             27.5     02-26    139  |  106  |  16  ----------------------------<  134<H>  4.3   |  25  |  0.8    Ca    8.8      26 Feb 2021 05:56      MEDICATIONS  (STANDING):  acetaminophen   Tablet .. 650 milliGRAM(s) Oral every 6 hours  aspirin enteric coated 81 milliGRAM(s) Oral every 12 hours  celecoxib 200 milliGRAM(s) Oral every 12 hours  chlorhexidine 4% Liquid 1 Application(s) Topical <User Schedule>  dextrose 40% Gel 15 Gram(s) Oral once  dextrose 5%. 1000 milliLiter(s) (50 mL/Hr) IV Continuous <Continuous>  dextrose 5%. 1000 milliLiter(s) (100 mL/Hr) IV Continuous <Continuous>  dextrose 50% Injectable 25 Gram(s) IV Push once  dextrose 50% Injectable 12.5 Gram(s) IV Push once  dextrose 50% Injectable 25 Gram(s) IV Push once  ferrous    sulfate 325 milliGRAM(s) Oral daily  glucagon  Injectable 1 milliGRAM(s) IntraMuscular once  insulin lispro (ADMELOG) corrective regimen sliding scale   SubCutaneous three times a day before meals  ketorolac   Injectable 15 milliGRAM(s) IV Push every 6 hours  metFORMIN 1000 milliGRAM(s) Oral two times a day  multivitamin 1 Tablet(s) Oral daily  pantoprazole    Tablet 40 milliGRAM(s) Oral before breakfast  sodium chloride 0.9%. 1000 milliLiter(s) (100 mL/Hr) IV Continuous <Continuous>    MEDICATIONS  (PRN):  ondansetron Injectable 4 milliGRAM(s) IV Push every 6 hours PRN Nausea and/or Vomiting  traMADol 50 milliGRAM(s) Oral every 4 hours PRN Severe Pain (7 - 10)          Imaging Personally Reviewed:     [x ] YES  [ ] NO    Consultant(s) Notes Reviewed:  [x ] YES  [ ] NO    Care Discussed with Consultants/Other Providers [x ] YES  [ ] NO           Medical Necessity Clause: This procedure was medically necessary because the lesion that was treated was: Detail Level: Detailed Size Of Lesion (*Required): 0.3 X Size Of Lesion Width In Cm (Optional): 0 Size Of Margin In Cm: 0.25 Punch Size In Mm: 8 Repair Type: None (Simple) Complex Requirements: Extensive Undermining Performed?: No Undermining Type: Entire Wound Debridement Text: The wound edges were debrided prior to proceeding with the closure to facilitate wound healing. Helical Rim Text: The closure involved the helical rim. Vermilion Border Text: The closure involved the vermilion border. Nostril Rim Text: The closure involved the nostril rim. Retention Suture Text: Retention sutures were placed to support the closure and prevent dehiscence. Include Undermining Statement In The Repair Note?: Yes Anesthesia Type: 2% lidocaine with epinephrine and a 1:12 solution of 8.4% sodium bicarbonate Anesthesia Volume In Cc: 2 Hemostasis: None Epidermal Sutures: 4-0 Ethilon Epidermal Closure: simple interrupted Wound Care: Petrolatum Wound Dressings: a bandage Suture Removal: 10 days Lab: [id number] Lab Facility: [id number] Path Notes (To The Dermatopathologist): Please check margins. 1.5 Mm Punch Excision Text: A 1.5 mm punch biopsy was used to excise the lesion to the level of the subcutaneous fat.  Blunt dissection was used to free the lesion from the surrounding tissues and the lesion was removed. 2 Mm Punch Excision Text: A 2 mm punch biopsy was used to excise the lesion to the level of the subcutaneous fat.  Blunt dissection was used to free the lesion from the surrounding tissues and the lesion was removed. 2.5 Mm Punch Excision Text: A 2.5 mm punch biopsy was used to excise the lesion to the level of the subcutaneous fat.  Blunt dissection was used to free the lesion from the surrounding tissues and the lesion was removed. 3 Mm Punch Excision Text: A 3 mm punch biopsy was used to excise the lesion to the level of the subcutaneous fat.  Blunt dissection was used to free the lesion from the surrounding tissues and the lesion was removed. 3.5 Mm Punch Excision Text: A 3.5 mm punch biopsy was used to excise the lesion to the level of the subcutaneous fat.  Blunt dissection was used to free the lesion from the surrounding tissues and the lesion was removed. 4 Mm Punch Excision Text: A 4 mm punch biopsy was used to excise the lesion to the level of the subcutaneous fat.  Blunt dissection was used to free the lesion from the surrounding tissues and the lesion was removed. 4.5 Mm Punch Excision Text: A 4.5 mm punch biopsy was used to excise the lesion to the level of the subcutaneous fat.  Blunt dissection was used to free the lesion from the surrounding tissues and the lesion was removed. 5 Mm Punch Excision Text: A 5 mm punch biopsy was used to excise the lesion to the level of the subcutaneous fat.  Blunt dissection was used to free the lesion from the surrounding tissues and the lesion was removed. 6 Mm Punch Excision Text: A 6 mm punch biopsy was used to excise the lesion to the level of the subcutaneous fat.  Blunt dissection was used to free the lesion from the surrounding tissues and the lesion was removed. 7 Mm Punch Excision Text: A 7 mm punch biopsy was used to excise the lesion to the level of the subcutaneous fat.  Blunt dissection was used to free the lesion from the surrounding tissues and the lesion was removed. 8 Mm Punch Excision Text: A 8 mm punch biopsy was used to excise the lesion to the level of the subcutaneous fat.  Blunt dissection was used to free the lesion from the surrounding tissues and the lesion was removed. 10 Mm Punch Excision Text: A 10 mm punch biopsy was used to excise the lesion to the level of the subcutaneous fat.  Blunt dissection was used to free the lesion from the surrounding tissues and the lesion was removed. 12 Mm Punch Excision Text: A 12 mm punch biopsy was used to excise the lesion to the level of the subcutaneous fat.  Blunt dissection was used to free the lesion from the surrounding tissues and the lesion was removed. Consent was obtained from the patient. The risks and benefits to therapy were discussed in detail. Specifically, the risks of infection, scarring, bleeding, prolonged wound healing, incomplete removal, allergy to anesthesia, nerve injury and recurrence were addressed. Prior to the procedure, the treatment site was clearly identified and confirmed by the patient. All components of Universal Protocol/PAUSE Rule completed. Post-Care Instructions: I reviewed with the patient in detail post-care instructions. Patient is to keep the biopsy site dry overnight, and then apply bacitracin twice daily until healed. Patient may apply hydrogen peroxide soaks to remove any crusting. Notification Instructions: Patient will be notified of biopsy results. However, patient instructed to call the office if not contacted within 2 weeks. Billing Type: Third-Party Bill

## 2021-03-04 PROBLEM — K21.9 GASTRO-ESOPHAGEAL REFLUX DISEASE WITHOUT ESOPHAGITIS: Chronic | Status: ACTIVE | Noted: 2021-02-25

## 2021-03-11 ENCOUNTER — APPOINTMENT (OUTPATIENT)
Dept: ORTHOPEDIC SURGERY | Facility: CLINIC | Age: 51
End: 2021-03-11
Payer: MEDICAID

## 2021-03-11 VITALS — TEMPERATURE: 96.6 F

## 2021-03-11 DIAGNOSIS — Z48.02 ENCOUNTER FOR REMOVAL OF SUTURES: ICD-10-CM

## 2021-03-11 PROCEDURE — 99024 POSTOP FOLLOW-UP VISIT: CPT

## 2021-03-11 RX ORDER — TRAMADOL HYDROCHLORIDE 50 MG/1
50 TABLET, COATED ORAL
Qty: 40 | Refills: 0 | Status: ACTIVE | COMMUNITY
Start: 2021-03-11 | End: 1900-01-01

## 2021-03-11 NOTE — PHYSICAL EXAM
[Cane] : ambulates with cane [Normal] : Alert and in no acute distress [de-identified] : Left Hip\par Inspection: No effusion\par Wounds: Incision is clean and dry, staples are intact\par Alignment: Normal\par Stability: No instability\par Palpation: No specific tenderness on palpation\par Muscle Test: 5/5 All motor groups\par Leg examination: Calf is soft and non-tender. [de-identified] : Sensation grossly intact about bilateral lower extremities.\par

## 2021-03-11 NOTE — ASSESSMENT
[FreeTextEntry1] : 50 year old male approximately two weeks status post left total knee replacement. Staples were removed from the incision site and steri-strips were applied. Pt was instructed he may shower, allowing the soap and water to run over the incision site, but not to directly scrub over the wound. Pt is to continue Aspirin 81 mg twice daily for DVT prophylaxis. Pt is to follow up in four weeks for reassessment. In the interim, if he develops any fevers, erythema, drainage from the incision site, or any concerning symptoms, we will see him prior to his scheduled appointment.\par

## 2021-03-11 NOTE — HISTORY OF PRESENT ILLNESS
[de-identified] : 50 year old male s/p left total hip replacement presents to the office today for her 2 week follow up. He is here today for staple removal. Patient is ambulating with a cane today. He reports taking Tramadol for pain with good pain control. He has continued to do physical therapy at home and will be completing this within the week. Patient states he has been taking Aspirin 81 mg twice daily for DVT prophylaxis. Patient denies any fevers, chills, drainage from the incision site, chest pain, or shortness of breath.\par

## 2021-04-06 ENCOUNTER — APPOINTMENT (OUTPATIENT)
Dept: ORTHOPEDIC SURGERY | Facility: CLINIC | Age: 51
End: 2021-04-06
Payer: MEDICAID

## 2021-04-06 DIAGNOSIS — Z98.890 OTHER SPECIFIED POSTPROCEDURAL STATES: ICD-10-CM

## 2021-04-06 PROCEDURE — 73502 X-RAY EXAM HIP UNI 2-3 VIEWS: CPT | Mod: LT

## 2021-04-06 PROCEDURE — 99024 POSTOP FOLLOW-UP VISIT: CPT

## 2021-04-06 NOTE — PHYSICAL EXAM
[Cane] : ambulates with cane [Normal] : Alert and in no acute distress [de-identified] : Left Hip\par Inspection: No effusion\par Wounds: Healed incision about the left hip, no drainage or erythema. Tenderness at the distal aspect of the incision.\par Alignment: Normal\par Stability: No instability\par Palpation: No specific tenderness on palpation\par ROM: Passive flexion to 80 degrees, difficulty with active flexion. External rotation to 15 degrees.\par Muscle Test: 5/5 All motor groups\par Leg examination: Calf is soft and non-tender. [de-identified] : Sensation grossly intact about bilateral lower extremities.\par  [de-identified] : Imaging done today, AP pelvis and lateral of the left hip shows the bony structures to be intact, there is a well aligned total hip replacement.

## 2021-04-06 NOTE — HISTORY OF PRESENT ILLNESS
[de-identified] : 50 year old male s/p left total hip replacement presents to the office today for his 6 week follow up. Patient is ambulating with a cane today, but reports only using it when he goes out. He has completed physical therapy, but continues to walk daily. He reports taking Tylenol daily for pain and states this is able to keep it well controlled. He has been taking Aspirin 81 mg twice daily for DVT prophylaxis. Patient reports getting back to his ADLs. Overall, patient reports doing well.\par

## 2021-04-06 NOTE — ASSESSMENT
[FreeTextEntry1] : 50 year old male approximately 6 weeks s/p left total hip replacement. He is ambulating well today with his cane. At this point, the patient was advised to stop his Aspirin for DVT prophylaxis. Patient was advised to continue to walk daily and to work on straight leg raises to help wit his active flexion. I did also give him some exercises to help work on his abduction. Patient is doing well overall. We will see him back in 6 weeks time for his 3 month follow up.

## 2021-05-26 ENCOUNTER — FORM ENCOUNTER (OUTPATIENT)
Age: 51
End: 2021-05-26

## 2021-05-26 ENCOUNTER — APPOINTMENT (OUTPATIENT)
Dept: ORTHOPEDIC SURGERY | Facility: CLINIC | Age: 51
End: 2021-05-26
Payer: MEDICAID

## 2021-05-26 NOTE — PHYSICAL EXAM
[Cane] : ambulates with cane [Normal] : Alert and in no acute distress [de-identified] : Left hip\par Inspection: No swelling or ecchymosis.\par Wounds: well healed incision \par Palpation: non-tender.\par Stability: no instability.\par Strength: 5/5 all motor groups.\par ROM: \par Leg length: equal. [de-identified] : Sensation grossly intact about bilateral lower extremities.\par  [de-identified] : Imaging done today, AP pelvis and lateral of the left hip shows the bony structures to be intact, there is a well aligned total hip replacement.

## 2021-05-26 NOTE — HISTORY OF PRESENT ILLNESS
[de-identified] : 50 year old male s/p left total hip replacement presents to the office today for his 3 month follow up.

## 2021-06-09 ENCOUNTER — APPOINTMENT (OUTPATIENT)
Dept: ORTHOPEDIC SURGERY | Facility: CLINIC | Age: 51
End: 2021-06-09
Payer: MEDICAID

## 2021-06-09 VITALS — TEMPERATURE: 97.9 F

## 2021-06-09 DIAGNOSIS — Z96.642 PRESENCE OF LEFT ARTIFICIAL HIP JOINT: ICD-10-CM

## 2021-06-09 PROCEDURE — 73502 X-RAY EXAM HIP UNI 2-3 VIEWS: CPT | Mod: LT

## 2021-06-09 PROCEDURE — 99213 OFFICE O/P EST LOW 20 MIN: CPT

## 2021-06-09 NOTE — PHYSICAL EXAM
[de-identified] : Left Hip\par Inspection: No effusion\par Wounds: Healed incision about the left hip, no drainage or erythema\par Alignment: Normal\par Stability: No instability\par Palpation: No specific tenderness on palpation\par ROM: Flexion to 80 , ER 30, ABD 30 \par Muscle Test: 5/5 All motor groups\par Leg examination: Calf is soft and non-tender.\par \par right hip-\par Flexion to 80, ER 30, ABD 30  [de-identified] : Radiographs done today AP pelvis and lateral of the left hip shows well aligned cementless total hip, no evidence of loosening or wear

## 2021-06-09 NOTE — HISTORY OF PRESENT ILLNESS
[de-identified] : 50 year old male s/p left total hip replacement presents to the office today for his 3 month follow up. Patient reports some discomfort still, but with only minimal pain. Patient denies any swelling, buckling, locking, clicking, or loss of motion. He is doing well with ambulation and negotiating stairs. There is stiffness after prolonged sitting. Patient reports taking Aleve occasionally for pain. Overall, patient states his left total hip is doing well.

## 2021-06-09 NOTE — ASSESSMENT
[FreeTextEntry1] : S/p LTH 3 months, doing very well. He is walking unlimited distances without a limp and without a cane. He can get in and out of cars, in and out of chairs, complete his ADLs. His only complaint is some incisional pain, I reassured him that this is normal. He is very satisfied. He can come back in 2 years, sooner if he has any issues.

## 2022-01-11 NOTE — DISCHARGE NOTE PROVIDER - PROVIDER RX CONTACT NUMBER
well developed, well nourished , in no acute distress , ambulating without difficulty , normal communication ability
[phone]

## 2022-11-08 NOTE — PATIENT PROFILE ADULT - HAVE YOU EXPERIENCED VIOLENCE OR A TRAUMATIC EVENT?
Transitional Care Management Telephone Call Attempt    Discharge Date: 11/3/22   Alcohol dependence with withdrawal    Discharge Location: [facility] Hospital: [facility]    Call Attempt Date: 11/7/2022  Call Attempt: Second   Patient needs a PHF with Dr. Frye, please call and assist in scheduling.  
Transitional Care Management Telephone Call Attempt    Discharge Date: 11/3/22   Alcohol dependence with withdrawal    Discharge Location: [facility] Hospital: [facility]    Call Attempt Date: 11/7/2022  Call Attempt: First  
no

## 2023-02-05 NOTE — PATIENT PROFILE ADULT - CAREGIVER ADDRESS
Rest.  Ice. Elevate. Tylenol and ibuprofen as needed for pain. Follow-up with your doctor next week. If symptoms or not improving you may need further evaluation and further imaging. Your blood pressure is elevated and will need to be monitored. Have this rechecked at your doctor's office or make an elbert w/ the doctor you were referred to on your discharge instructions to have your blood pressure rechecked sometime within the next 7 days.  Untreated elevated blood pressure puts you at risk for stroke, heart attack, kidney disease and other serious medical conditions [address] [place] [id number]

## 2023-12-15 ENCOUNTER — APPOINTMENT (OUTPATIENT)
Dept: ORTHOPEDIC SURGERY | Facility: CLINIC | Age: 53
End: 2023-12-15
Payer: COMMERCIAL

## 2023-12-15 VITALS — HEIGHT: 69 IN | WEIGHT: 210 LBS | BODY MASS INDEX: 31.1 KG/M2

## 2023-12-15 DIAGNOSIS — R25.2 CRAMP AND SPASM: ICD-10-CM

## 2023-12-15 PROCEDURE — 99204 OFFICE O/P NEW MOD 45 MIN: CPT

## 2023-12-15 NOTE — ASSESSMENT
[FreeTextEntry1] : Patient comes in with bilateral hand spasming.  He says the left side is worse than the right side.  He notices the left long and ring fingers flexed down straight.  They do not bend at the PIP joints only at the MP joints.  It happens about once a month.  He says that it gets better.  It occurs when he uses his hands a lot.  He works in manual labor.  He does not have other complaints today.  He has not had any treatment for this.  He does not complain of any numbness or tingling.  Bilateral hands: No gross deformities, slightly tender palpation over A1 pulley of long finger and ring finger on the left side, nontender palpation over any A1 pulleys, no locking, no triggering, negative Tinel's, negative Malia's, full range of motion of fingers, neurovascular intact  I believe the patient most likely has spasms to the hands.  He does not have any evidence right now of trigger fingers.  He says it happens once a month.  I am recommending that he do stretching before and after work as well as taking a break sometimes as well.  I also recommend the patient stay hydrated and have a well-balanced diet as he can be uneven on certain nutrients.  If it gets worse he should return.  If he starts having locking in a different manner he should also return.  If he starts having numbness and tingling he should return.  I believe the patient should improve with this regimen.

## 2024-04-12 ENCOUNTER — APPOINTMENT (OUTPATIENT)
Dept: ORTHOPEDIC SURGERY | Facility: CLINIC | Age: 54
End: 2024-04-12
Payer: COMMERCIAL

## 2024-04-12 VITALS — HEIGHT: 69 IN | WEIGHT: 203 LBS | BODY MASS INDEX: 30.07 KG/M2

## 2024-04-12 DIAGNOSIS — S69.81XA OTHER SPECIFIED INJURIES OF RIGHT WRIST, HAND AND FINGER(S), INITIAL ENCOUNTER: ICD-10-CM

## 2024-04-12 PROCEDURE — 99213 OFFICE O/P EST LOW 20 MIN: CPT | Mod: 25

## 2024-04-12 PROCEDURE — 73110 X-RAY EXAM OF WRIST: CPT | Mod: RT

## 2024-04-12 PROCEDURE — 20605 DRAIN/INJ JOINT/BURSA W/O US: CPT

## 2024-04-12 NOTE — DATA REVIEWED
[FreeTextEntry1] : Radiographs 3 views the right wrist reviewed showing no fracture dislocation no destructive lesions

## 2024-04-12 NOTE — ASSESSMENT
[FreeTextEntry1] : 3-year-old male right-sided wrist pain discomfort.  I gave him cortisone injection into the wrist on the ulnar side today.  He tolerated that well.  If he does not get better with the injection he will try wrist widget brace.  If 6 weeks later he still having pain discomfort I will get an MRI I think he has a TFCC tear.

## 2024-04-12 NOTE — PROCEDURE
[FreeTextEntry3] : Wrist injection was performed because of pain inflammation and stiffness Anesthesia: ethyl chloride sprayed topically Dexamethasone: An injection of Dexamethasone 1cc  4mg/ml Lidocaine: An injection of Lidocaine 1% 1cc  Patient has tried OTC's including aspirin, Ibuprofen, Aleve etc or prescription NSAIDS, and/or exercises at home and/ or physical therapy without satisfactory response. After verbal consent using sterile preparation and technique. The risks, benefits, and alternatives to cortisone injection were explained in full to the patient. Risks outlined include but are not limited to infection, sepsis, bleeding, scarring, skin discoloration, temporary increase in pain, syncopal episode, failure to resolve symptoms, allergic reaction, symptom recurrence, and elevation of blood sugar in diabetics. Patient understood the risks. All questions were answered. After discussion of options, patient requested an injection. Oral informed consent was obtained and sterile prep was done of the injection site. Sterile technique was utilized for the procedure including the preparation of the solutions used for the injection. Patient tolerated the procedure well. Advised to ice the injection site this evening. Prep with ETOH locally to site. Sterile technique used  Right wrist radiocarpal joint

## 2024-04-12 NOTE — HISTORY OF PRESENT ILLNESS
[de-identified] : 53-year-old male with right-sided wrist pain discomfort.  Comes in today for evaluation.  He has no specific injury.

## 2024-04-12 NOTE — PHYSICAL EXAM
[de-identified] : Patient is tender to palpation on the ulnar side of the wrist.  Has good supination pronation.  He has a volar radial sided wrist mass more proximal consistent with a lipoma.  He has good range of motion of the fingers.  Negative Tinel's no instability

## 2024-11-17 ENCOUNTER — EMERGENCY (EMERGENCY)
Facility: HOSPITAL | Age: 54
LOS: 0 days | Discharge: HOME | End: 2024-11-18
Attending: EMERGENCY MEDICINE
Payer: COMMERCIAL

## 2024-11-17 VITALS
RESPIRATION RATE: 18 BRPM | SYSTOLIC BLOOD PRESSURE: 188 MMHG | HEART RATE: 95 BPM | DIASTOLIC BLOOD PRESSURE: 95 MMHG | OXYGEN SATURATION: 98 % | HEIGHT: 69 IN | TEMPERATURE: 98 F | WEIGHT: 203.05 LBS

## 2024-11-17 DIAGNOSIS — L29.9 PRURITUS, UNSPECIFIED: ICD-10-CM

## 2024-11-17 DIAGNOSIS — S20.469A INSECT BITE (NONVENOMOUS) OF UNSPECIFIED BACK WALL OF THORAX, INITIAL ENCOUNTER: ICD-10-CM
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
## 2024-11-17 DIAGNOSIS — L53.9 ERYTHEMATOUS CONDITION, UNSPECIFIED: ICD-10-CM

## 2024-11-17 DIAGNOSIS — Z98.890 OTHER SPECIFIED POSTPROCEDURAL STATES: Chronic | ICD-10-CM
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
## 2024-11-17 DIAGNOSIS — S10.96XA INSECT BITE OF UNSPECIFIED PART OF NECK, INITIAL ENCOUNTER: ICD-10-CM

## 2024-11-17 DIAGNOSIS — E11.9 TYPE 2 DIABETES MELLITUS WITHOUT COMPLICATIONS: ICD-10-CM

## 2024-11-17 DIAGNOSIS — S00.96XA INSECT BITE (NONVENOMOUS) OF UNSPECIFIED PART OF HEAD, INITIAL ENCOUNTER: ICD-10-CM

## 2024-11-17 DIAGNOSIS — Z90.49 ACQUIRED ABSENCE OF OTHER SPECIFIED PARTS OF DIGESTIVE TRACT: Chronic | ICD-10-CM

## 2024-11-17 DIAGNOSIS — S20.369A INSECT BITE (NONVENOMOUS) OF UNSPECIFIED FRONT WALL OF THORAX, INITIAL ENCOUNTER: ICD-10-CM

## 2024-11-17 DIAGNOSIS — F17.200 NICOTINE DEPENDENCE, UNSPECIFIED, UNCOMPLICATED: ICD-10-CM

## 2024-11-17 PROCEDURE — 99282 EMERGENCY DEPT VISIT SF MDM: CPT

## 2024-11-17 PROCEDURE — 99283 EMERGENCY DEPT VISIT LOW MDM: CPT

## 2024-11-17 NOTE — ED PROVIDER NOTE - CLINICAL SUMMARY MEDICAL DECISION MAKING FREE TEXT BOX
53-year-old male with history of NIDDM presents with rash that he noted primarily to his scalp, also mildly to his axillae, itchy. States onset on Friday. Went to a pharmacy a few hours prior to arrival here and was given permethrin, which he applied for the first time a few hours prior to arrival. 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 Denies all other symptoms. On exam, afebrile, hemodynamically stable, saturating well on room air, NAD, well appearing, sitting comfortably in bed, no WOB, speaking full sentences, head NCAT, mild generalized erythema to scalp, some excoriation to bilateral axillary, no undue warmth, no induration/fluctuance, no specific bite pattern, EOMI grossly, anicteric, MMM, breathing comfortably on room air, AAO, CN's 3-12 grossly intact, WILSON spontaneously, no webspace burrows. No evidence of bacterial infection/cellulitis to warrant antibiotics at this time. Low suspicion for allergic reaction. No specific pattern or picture of an insect to identify specific pathology. Recommended capturing a bug as well as dermatology follow-up.

## 2024-11-17 NOTE — ED PROVIDER NOTE - NSFOLLOWUPINSTRUCTIONS_ED_ALL_ED_FT
Our Emergency Department Referral Coordinators will be reaching out to you in the next 24-48 hours from 9:00am to 5:00pm with a follow up appointment. Please expect a phone call from the hospital in that time frame. If you do not receive a call or if you have any questions or concerns, you can reach them at [phone].    Rash    A rash is a change in the color of the skin. A rash can also change the way your skin feels. There are many different conditions and factors that can cause a rash, most of which are not dangerous. Make sure to follow up with your primary care physician or a dermatologist as instructed by your health care provider.    SEEK IMMEDIATE MEDICAL CARE IF YOU HAVE ANY OF THE FOLLOWING SYMPTOMS: fever, blisters, a rash inside your mouth, vaginal or anal pain, or altered mental status.

## 2024-11-17 NOTE — ED PROVIDER NOTE - NSTIMEPROVIDERCAREINITIATE_GEN_ER
Patient is in the lateral left side position.   The body was positioned using the following devices: wedge.  Positioned self
17-Nov-2024 09:48

## 2024-11-17 NOTE — ED PROVIDER NOTE - NSICDXPASTMEDICALHX_GEN_ALL_CORE_FT
PAST MEDICAL HISTORY:  DM (diabetes mellitus)     Gastroesophageal reflux disease, unspecified whether esophagitis present     OA (osteoarthritis)

## 2024-11-17 NOTE — ED PROVIDER NOTE - NSICDXPASTSURGICALHX_GEN_ALL_CORE_FT
PAST SURGICAL HISTORY:  History of back surgery L4, L5    S/P appendectomy     S/P cholecystectomy

## 2024-11-17 NOTE — ED PROVIDER NOTE - OBJECTIVE STATEMENT
53-year-old male with history of diabetes presents to the ED complaining of itchy rash to scalp, neck, face, chest, back, and arms and legs since Friday.  Patient states removed black insects from forehead.  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

## 2024-11-17 NOTE — ED ADULT TRIAGE NOTE - CHIEF COMPLAINT QUOTE
Generalized rash (head, groin, toes, underarms, back ) , pt denies seeing any insects in home, denies recent travel, denies fevers  no recent travel

## 2024-11-17 NOTE — ED PROVIDER NOTE - PATIENT PORTAL LINK FT
You can access the FollowMyHealth Patient Portal offered by [facility] by registering at the following website: [email or web address]. By joining [employer] FollowMyHealth portal, you will also be able to view your health information using other applications (apps) compatible with our system.

## 2024-11-17 NOTE — ED ADULT TRIAGE NOTE - SOURCE OF INFORMATION
1200 patient received via bed, alert x4, dressing cdi. Instructed on using I.S. Tolerated diet no nausea  Incontinent of urine  Patient voidede 600cc.  Bladder scn 999cc  Dr. Kris Avila in [facility] aware of bladder scan results  Oob walking in tubbs with physio  Ultram given for pain  Discharge instructions given  Discharged via  Patient

## 2025-03-06 NOTE — ED ADULT NURSE NOTE - RADIATION
Spoke with patient regarding procedure scheduled on 3.19     Arrival time 0630     Has patient been sick with fever or on antibiotics within the last 7 days? No     Does the patient have any open wounds, sores or rashes? No     Does the patient have any recent fractures? no     Has patient received a vaccination within the last 7 days? No     Received the COVID vaccination?      Has the patient stopped all medications as directed? na     Does patient have a pacemaker, defibrillator, or implantable stimulator? No     Does the patient have a ride to and from procedure and someone reliable to remain with patient?  URMILA     Is the patient diabetic? no     Does the patient have sleep apnea? Or use O2 at home? no     Is the patient receiving sedation?      Is the patient instructed to remain NPO beginning at midnight the night before their procedure? yes     Procedure location confirmed with patient? Yes     Covid- Denies signs/symptoms. Instructed to notify PAT/MD if any changes.  
jaw